# Patient Record
Sex: MALE | Race: WHITE | Employment: FULL TIME | URBAN - METROPOLITAN AREA
[De-identification: names, ages, dates, MRNs, and addresses within clinical notes are randomized per-mention and may not be internally consistent; named-entity substitution may affect disease eponyms.]

---

## 2018-12-24 ENCOUNTER — HOSPITAL ENCOUNTER (EMERGENCY)
Age: 33
Discharge: HOME | End: 2018-12-24
Attending: EMERGENCY MEDICINE
Payer: SELF-PAY

## 2018-12-24 VITALS
BODY MASS INDEX: 40.43 KG/M2 | SYSTOLIC BLOOD PRESSURE: 159 MMHG | HEART RATE: 108 BPM | TEMPERATURE: 98.9 F | RESPIRATION RATE: 23 BRPM | WEIGHT: 315 LBS | HEIGHT: 74 IN | OXYGEN SATURATION: 99 % | DIASTOLIC BLOOD PRESSURE: 95 MMHG

## 2018-12-24 DIAGNOSIS — I47.1 SUSTAINED SVT (HCC): Primary | ICD-10-CM

## 2018-12-24 DIAGNOSIS — D72.829 LEUKOCYTOSIS, UNSPECIFIED TYPE: ICD-10-CM

## 2018-12-24 LAB
ALBUMIN SERPL-MCNC: 4.1 G/DL (ref 3.5–5)
ALBUMIN/GLOB SERPL: 0.9 {RATIO} (ref 1.1–2.2)
ALP SERPL-CCNC: 85 U/L (ref 45–117)
ALT SERPL-CCNC: 56 U/L (ref 12–78)
ANION GAP SERPL CALC-SCNC: 12 MMOL/L (ref 5–15)
AST SERPL-CCNC: 34 U/L (ref 15–37)
ATRIAL RATE: 241 BPM
ATRIAL RATE: 99 BPM
BASOPHILS # BLD: 0.1 K/UL (ref 0–0.1)
BASOPHILS NFR BLD: 1 % (ref 0–1)
BILIRUB SERPL-MCNC: 0.4 MG/DL (ref 0.2–1)
BUN SERPL-MCNC: 11 MG/DL (ref 6–20)
BUN/CREAT SERPL: 12 (ref 12–20)
CALCIUM SERPL-MCNC: 9 MG/DL (ref 8.5–10.1)
CALCULATED P AXIS, ECG09: 26 DEGREES
CALCULATED R AXIS, ECG10: 130 DEGREES
CALCULATED R AXIS, ECG10: 94 DEGREES
CALCULATED T AXIS, ECG11: -100 DEGREES
CALCULATED T AXIS, ECG11: 28 DEGREES
CHLORIDE SERPL-SCNC: 102 MMOL/L (ref 97–108)
CK MB CFR SERPL CALC: 1 % (ref 0–2.5)
CK MB SERPL-MCNC: 2.7 NG/ML (ref 5–25)
CK SERPL-CCNC: 272 U/L (ref 39–308)
CO2 SERPL-SCNC: 24 MMOL/L (ref 21–32)
CREAT SERPL-MCNC: 0.91 MG/DL (ref 0.7–1.3)
DIAGNOSIS, 93000: NORMAL
DIAGNOSIS, 93000: NORMAL
DIFFERENTIAL METHOD BLD: ABNORMAL
EOSINOPHIL # BLD: 0 K/UL (ref 0–0.4)
EOSINOPHIL NFR BLD: 0 % (ref 0–7)
ERYTHROCYTE [DISTWIDTH] IN BLOOD BY AUTOMATED COUNT: 12.8 % (ref 11.5–14.5)
GLOBULIN SER CALC-MCNC: 4.4 G/DL (ref 2–4)
GLUCOSE SERPL-MCNC: 134 MG/DL (ref 65–100)
HCT VFR BLD AUTO: 53 % (ref 36.6–50.3)
HGB BLD-MCNC: 18.3 G/DL (ref 12.1–17)
IMM GRANULOCYTES # BLD: 0.2 K/UL (ref 0–0.04)
IMM GRANULOCYTES NFR BLD AUTO: 1 % (ref 0–0.5)
LYMPHOCYTES # BLD: 3.1 K/UL (ref 0.8–3.5)
LYMPHOCYTES NFR BLD: 20 % (ref 12–49)
MCH RBC QN AUTO: 32.2 PG (ref 26–34)
MCHC RBC AUTO-ENTMCNC: 34.5 G/DL (ref 30–36.5)
MCV RBC AUTO: 93.1 FL (ref 80–99)
MONOCYTES # BLD: 1.7 K/UL (ref 0–1)
MONOCYTES NFR BLD: 11 % (ref 5–13)
NEUTS SEG # BLD: 10.2 K/UL (ref 1.8–8)
NEUTS SEG NFR BLD: 67 % (ref 32–75)
NRBC # BLD: 0 K/UL (ref 0–0.01)
NRBC BLD-RTO: 0 PER 100 WBC
P-R INTERVAL, ECG05: 150 MS
PLATELET # BLD AUTO: 286 K/UL (ref 150–400)
PMV BLD AUTO: 10.5 FL (ref 8.9–12.9)
POTASSIUM SERPL-SCNC: 3.4 MMOL/L (ref 3.5–5.1)
PROT SERPL-MCNC: 8.5 G/DL (ref 6.4–8.2)
Q-T INTERVAL, ECG07: 180 MS
Q-T INTERVAL, ECG07: 378 MS
QRS DURATION, ECG06: 100 MS
QRS DURATION, ECG06: 74 MS
QTC CALCULATION (BEZET), ECG08: 359 MS
QTC CALCULATION (BEZET), ECG08: 485 MS
RBC # BLD AUTO: 5.69 M/UL (ref 4.1–5.7)
SODIUM SERPL-SCNC: 138 MMOL/L (ref 136–145)
TROPONIN I SERPL-MCNC: <0.05 NG/ML
VENTRICULAR RATE, ECG03: 239 BPM
VENTRICULAR RATE, ECG03: 99 BPM
WBC # BLD AUTO: 15.4 K/UL (ref 4.1–11.1)

## 2018-12-24 PROCEDURE — 36415 COLL VENOUS BLD VENIPUNCTURE: CPT

## 2018-12-24 PROCEDURE — 96374 THER/PROPH/DIAG INJ IV PUSH: CPT

## 2018-12-24 PROCEDURE — 96361 HYDRATE IV INFUSION ADD-ON: CPT

## 2018-12-24 PROCEDURE — 82550 ASSAY OF CK (CPK): CPT

## 2018-12-24 PROCEDURE — 80053 COMPREHEN METABOLIC PANEL: CPT

## 2018-12-24 PROCEDURE — 84484 ASSAY OF TROPONIN QUANT: CPT

## 2018-12-24 PROCEDURE — 93005 ELECTROCARDIOGRAM TRACING: CPT

## 2018-12-24 PROCEDURE — 99285 EMERGENCY DEPT VISIT HI MDM: CPT

## 2018-12-24 PROCEDURE — 74011250636 HC RX REV CODE- 250/636: Performed by: EMERGENCY MEDICINE

## 2018-12-24 PROCEDURE — 74011250636 HC RX REV CODE- 250/636

## 2018-12-24 PROCEDURE — 85025 COMPLETE CBC W/AUTO DIFF WBC: CPT

## 2018-12-24 RX ORDER — ADENOSINE 3 MG/ML
12 INJECTION, SOLUTION INTRAVENOUS
Status: COMPLETED | OUTPATIENT
Start: 2018-12-24 | End: 2018-12-24

## 2018-12-24 RX ORDER — ADENOSINE 3 MG/ML
INJECTION, SOLUTION INTRAVENOUS
Status: COMPLETED
Start: 2018-12-24 | End: 2018-12-24

## 2018-12-24 RX ADMIN — ADENOSINE 12 MG: 3 INJECTION, SOLUTION INTRAVENOUS at 00:52

## 2018-12-24 RX ADMIN — SODIUM CHLORIDE 1000 ML: 900 INJECTION, SOLUTION INTRAVENOUS at 00:51

## 2018-12-24 NOTE — DISCHARGE INSTRUCTIONS
Supraventricular Tachycardia: Care Instructions  Your Care Instructions    Having supraventricular tachycardia (SVT) means that from time to time your heart beats abnormally fast. This fast rhythm is caused by changes in the electrical system of your heart. You may feel a fluttering in your chest (palpitations) and have a fast pulse. When your heart is beating fast, you may feel anxious and lightheaded, be short of breath, and feel discomfort in the chest.  Your doctor may prescribe medicines to help slow down your heartbeat. Your doctor may also suggest you try vagal maneuvers when having an episode of SVT. These are things, like bearing down, that might help slow your heart rate. Bearing down means that you try to breathe out with your stomach muscles but you don't let air out of your nose or mouth. Your doctor can show you how to do vagal maneuvers. He or she may suggest you lie down on your back to do them. In some cases, either cardioversion treatment or a procedure called catheter ablation is done to correct SVT. Your doctor may ask you to wear a small electronic device for 1 or 2 days to monitor your heart. It is called a Holter monitor. Follow-up care is a key part of your treatment and safety. Be sure to make and go to all appointments, and call your doctor if you are having problems. It's also a good idea to know your test results and keep a list of the medicines you take. How can you care for yourself at home? · Be safe with medicines. Take your medicines exactly as prescribed. Call your doctor if you think you are having a problem with your medicine. You will get more details on the specific medicines your doctor prescribes. · If your doctor showed you how to do vagal maneuvers, try them when you have an episode. These maneuvers include bearing down or putting an ice-cold, wet towel on your face. · Monitor your condition by keeping a diary of your SVT episodes.  Bring this to your doctor appointments. ? Write down how fast or slow your heart was beating. To count your heart rate:  § Gently place 2 fingers of your hand on the inside of your other wrist, below your thumb. § Count the beats for 30 seconds. § Then, double the result to get the number of beats per minute. ? Write down if your heart rhythm was regular or irregular. ? Write down the symptoms you had.  ? Write down the time of day your symptoms occurred. ? Write down how long your symptoms lasted. ? Write down what you were doing when your symptoms started. ? Write down what may have helped your symptoms go away. · If they trigger episodes, limit or avoid alcohol or drinks with caffeine. · Do not use over-the-counter decongestants, herbal remedies, diet pills, or \"pep\" pills, which often contain stimulants. · Do not use illegal drugs, such as cocaine, ecstasy, or methamphetamine, which can speed up your heart's rhythm. · Do not smoke. Smoking can make this condition worse. If you need help quitting, talk to your doctor about stop-smoking programs and medicines. These can increase your chances of quitting for good. · Be alert for new or worsening symptoms, such as shortness of breath, pounding of your heart, or unusual tiredness. If new symptoms develop or your symptoms become worse, call your doctor. When should you call for help? Call 911 anytime you think you may need emergency care. For example, call if:    · You passed out (lost consciousness).     · You are short of breath.    Call your doctor now or seek immediate medical care if:    · You have a fast heartbeat.     · You are dizzy or lightheaded, or feel like you may faint.    Watch closely for changes in your health, and be sure to contact your doctor if:    · You do not get better as expected. Where can you learn more? Go to http://erin-yuriy.info/.   Enter G244 in the search box to learn more about \"Supraventricular Tachycardia: Care Instructions. \"  Current as of: December 6, 2017  Content Version: 11.8  © 2382-8339 [employer]. Care instructions adapted under license by [employer] (which disclaims liability or warranty for this information). If you have questions about a medical condition or this instruction, always ask your healthcare professional. [address] any warranty or liability for your use of this information. Chemical Cardioversion: Care Instructions  Your Care Instructions    Cardioversion resets your heart's rhythm to its normal pattern. It treats heart rhythm problems like atrial fibrillation or supraventricular tachycardia. Chemical cardioversion uses rhythm-control medicines to reset your heart. They can also help keep your heart in a normal rhythm after it has been reset. You may take this medicine as pills. Or you may get it in your arm through a tube called an IV. If you have an IV, it will be done in the hospital. If you use the pills, you might start them in the hospital. Or you might start the pills at home. Your doctor may ask you to take other medicines before your cardioversion. They can help keep blood clots from forming. And they can prevent the heart-rate problem from coming back. Sometimes the heart rate doesn't go back to normal. Or it may reset for a while and then go back to an uneven rate. If this happens, you may need electrical cardioversion. Follow-up care is a key part of your treatment and safety. Be sure to make and go to all appointments, and call your doctor if you are having problems. It's also a good idea to know your test results and keep a list of the medicines you take. How can you care for yourself at home? · Talk to your doctor about the benefits and risks of these medicines. They might cause serious side effects. Your doctor will want to see you often. Be sure to go to all of your doctor visits.   · Take your medicines exactly as prescribed. Call your doctor if you think you are having a problem with your medicine. · Check with your doctor or pharmacist before you use any other medicines. This includes over-the-counter medicines. Make sure your doctor knows all of the medicines, vitamins, herbal products, and supplements you take. Taking some medicines together can cause problems. When should you call for help? Call 911 anytime you think you may need emergency care. For example, call if:    · You passed out (lost consciousness).     · You have chest pain or pressure. This may occur with:  ? Sweating. ? Shortness of breath. ? Nausea or vomiting. ? Pain, pressure, or a strange feeling in your back, neck, jaw, or upper belly or in one or both shoulders or arms. ? Lightheadedness or sudden weakness. ? A fast or irregular heartbeat.    After you call 911, the  may tell you to chew 1 adult-strength or 2 to 4 low-dose aspirin. Wait for an ambulance. Do not try to drive yourself.    · You have symptoms of a stroke. These may include:  ? Sudden numbness, tingling, weakness, or loss of movement in your face, arm, or leg, especially on only one side of your body. ? Sudden vision changes. ? Sudden trouble speaking. ? Sudden confusion or trouble understanding simple statements. ? Sudden problems with walking or balance. ? A sudden, severe headache that is different from past headaches.    Call your doctor now or seek immediate medical care if:    · You are dizzy or lightheaded, or you feel like you may faint.     · You have a fast or irregular heartbeat.    Watch closely for changes in your health, and be sure to contact your doctor if you are having any problems. Where can you learn more? Go to http://erin-yuriy.info/. Enter M734 in the search box to learn more about \"Chemical Cardioversion: Care Instructions. \"  Current as of: December 6, 2017  Content Version: 11.8  © 3009-5829 [employer], [employer]. Care instructions adapted under license by [employer] (which disclaims liability or warranty for this information). If you have questions about a medical condition or this instruction, always ask your healthcare professional. [address] any warranty or liability for your use of this information. Learning About High White Blood Cell Counts  What are white blood cells? White blood cells (leukocytes) help protect your body from infection. Normally, when germs get inside your body, your body makes more white blood cells that search for and destroy the germs. Less often, there are medical problems where the body may make a lot more white blood cells than it needs. What happens when you have a high white blood cell count? Your white blood cell count may be high because your body is fighting an infection. But other things can cause it, such as some medicines, burns, an illness, or other health problems. When your doctor sees that your white blood cell count is high, he or she will try to find out why, and then treat the cause. What are the symptoms? A high white blood cell count alone doesn't cause any symptoms. The symptoms you feel may come from the medical problem that your white blood cells are fighting. For example, if you have pneumonia, you may have a fever and trouble breathing. These are symptoms of pneumonia, not of a high white blood cell count. How is it treated? · Your doctor may do more tests to find the problem that's making your white blood cell count high. Once your doctor finds the problem, he or she may be able to treat it. · Part of your treatment may be telling your doctor if you feel worse. Watch your temperature, and call your doctor if your fever goes up and stays up. Follow-up care is a key part of your treatment and safety. Be sure to make and go to all appointments, and call your doctor if you are having problems.  It's also a good idea to know your test results and keep a list of the medicines you take. Where can you learn more? Go to http://erin-yuriy.info/. Enter C891 in the search box to learn more about \"Learning About High White Blood Cell Counts. \"  Current as of: June 26, 2018  Content Version: 11.8  © 9489-0969 [employer]. Care instructions adapted under license by [employer] (which disclaims liability or warranty for this information). If you have questions about a medical condition or this instruction, always ask your healthcare professional. [place] [address] any warranty or liability for your use of this information.     Local Primary Care Physicians     [facility] [phone]   MD Marialuisa Rocha MD     [facility] [phone]   Colt Baig, [facility]   MD Ayan Camejo MD Abron Bale, MD [address] [address] [phone]   MD Amy Larson MD    [address] [facility] [phone]   MD Ilya Montemayor MD Emeline Hoffmann, MD Armando Baron, MD     [facility] [phone]   [facility] JCFFGM DUGLAS, MD Asad Gavin, NP     [facility] [phone]   [place] MD Berenice Hsu MD Eddy Gails, MD Marquetta Raker, MD Alyne Kohler, MD Lorena Carson, MD Mindi Artist, MD     [facility] [phone]   Kareem Tobias MD    [facility] [phone]   MD Carter Padilla, NP   MD Miles Arvizu MD Bethel Arista, MD Devorah Oto, MD     [address] [facility] [phone]   MD Pardeep Stanley, P   Lawrence Solo, MD Elizabeth Will MD Sabino Dinning, MD Era Barber, MD    [facility] [phone]   MD Marshall Miner, DO  MD Elijah Maria MD Melanie Dies, MD Zackery Decent, MD     [facility] [phone]   MD Shyam Noyola MD [place] [phone]   MD Iván Avendano MD Sharl Craven, MD     [address] [facility] [phone]   MD Lucila Vasquez MD Consuella Jerry, MD Fausto Frater, MD Reda Moos, VICKI Chou MD     [address] [facility] [phone]   MD Mary Ann Wise MD Harrietta Delaware, MD     [address] [facility] [phone] 1535 MD Julián Green, FNP   Rosalind Arrington, PAJonoC   Rosalind Arrington, FNP   Krista Fischer, PA-C   MD Linh Capellan, NP   Jan Reyes DO     Miscellaneous:     [facility] SYSTEMS Departments    For adult and child immunizations, family planning, TB screening, STD testing and women's health services. [facility] [place] [phone]     [address]: [facility] [address] [facility] [address] [facility] [address] [phone]     [facility]        [address] [place] [address]    For primary care services, woman and child wellness, and some clinics providing specialty care. [facility]. [facility]5 [facility] [phone]  [address] [place] [address] 200 [facility] 3611 [facility] [phone]  [address] [facility] [address] [phone]  [address]  [place] [address] [place] [address] [address] [facility] [address] [phone] [address] [facility] [address]  [address] [address] [place] [phone]  [facility] 81 [facility] [phone]  [address] [place] [facility] [place] [address] [place] [address] [phone]  [facility]: [address] [phone], [facility], [address] [phone]    [place] [address] [phone]  [facility] [address] [facility] [phone]  [employer]  [address], [address] ([email or web address]) [phone]       Sexual Health/Woman Wellness Clinics   For STD/HIV testing and treatment, pregnancy testing and services, men's health, birth control services, LGBT services, and hepatitis/HPV vaccine services. [facility] [employer] [address] [facility] 1900 [facility] [address] [facility] [address] [phone]  [facility] [address], 5th floor [phone]  Pregnancy 3928 [facility] [address] [facility] [address] for Women [address] [phone] 8260 [facility] 401 [facility],[address]   [phone]  [place]   [phone]  Women, Infant and Children's Services: [address] [phone]      [address] [phone]  [facility]   [phone]  [facility]   [phone]  [facility]     [phone]  [address] [place] [address] [phone]    Thank you for allowing us to provide you with excellent care today. We hope we addressed all of your concerns and needs. We strive to provide excellent quality care in the Emergency Department. Please rate us as excellent, as anything less than excellent does not meet our expectations. If you feel that you have not received excellent quality care or timely care, please ask to speak to the nurse manager. Please choose us in the future for your continued health care needs. The exam and treatment you received in the Emergency Department were for an urgent problem and are not intended as complete care.  It is important that you follow up with a doctor, nurse practitioner, or physician assistant for ongoing care. If your symptoms become worse or you do not improve as expected and you are unable to reach your usual health care provider, you should return to the Emergency Department. We are available 24 hours a day. Take this sheet with you when you go to your follow-up visit. If you have any problem arranging the follow-up visit, contact the Emergency Department immediately. If a prescription has been provided, please have it filled as soon as possible to avoid a delay in treatment. Read the entire medication instruction sheet provided to you by the pharmacy. If you have any questions or reservations about taking the medication due to side effects or interactions with other medications please call the ER or your primary care physician. Take this sheet with you when you go to your follow-up visit. Make an appointment with your family doctor or the physician you were referred to for follow-up of this visit, as this is mandatory follow-up. Return to the ER if you are unable to be seen or if you are unable to be seen in a timely manner. If you have any problem arranging the follow-up visit, contact the Emergency Department immediately.

## 2018-12-24 NOTE — ED NOTES
Emergency Department Nursing Progress Note:    Patient ambulatory to restroom with steady gait. Denies dizziness or sensation of palpitations.

## 2018-12-24 NOTE — ED NOTES
Assumed care of pt. Pt states around 1930 he noticed he was having chest pain. Pt has hx of SVT. Pt was given 12 mg Adenosine. Pt on 4L O2 via nasal cannula.

## 2018-12-24 NOTE — ED PROVIDER NOTES
EMERGENCY DEPARTMENT HISTORY AND PHYSICAL EXAM           Date: 12/24/2018  Patient Name: Cathy Tello    History of Presenting Illness     Chief Complaint   Patient presents with    Rapid Heart Rate       History Provided By: Patient    HPI: Cathy Tello is a 26 y.o. male, pmhx SVT, who presents ambulatory to the ED c/o persistent substernal chest pain x 6 hours. Pt states the pain radiates up into his throat and is described as pressure. Upon ED arrival pt's heart rate 238. He reports hx of similar sx's, however \"I'm typically able to breath through it\" and states he is not currently followed by cardiology or primary care. He is not currently on any daily medications. Pt denies hx of [employer] Syndrome. He specifically denies any recent fevers, chills, nausea, vomiting, diarrhea, abd pain, SOB, urinary sxs, changes in BM, or headache. PCP: None    Allergies: none  PMHx: Significant for SVT  PSHx: Significant for none  Social Hx: + tobacco (1 ppd), + EtOH (1 beer/day), - Illicit Drugs    There are no other complaints, changes, or physical findings at this time. Past History     Past Medical History:  Past Medical History:   Diagnosis Date    Heart failure ([address]     SVT       Past Surgical History:  History reviewed. No pertinent surgical history. Family History:  History reviewed. No pertinent family history. Social History:  Social History     Tobacco Use    Smoking status: Current Every Day Smoker     Packs/day: 1.00    Smokeless tobacco: Never Used   Substance Use Topics    Alcohol use: Yes     Comment: 1 drink (beer) a day    Drug use: No       Allergies:  No Known Allergies      Review of Systems   Review of Systems   Constitutional: Negative for chills and fever. HENT: Negative. Eyes: Negative. Respiratory: Negative for cough, chest tightness and shortness of breath. Cardiovascular: Positive for chest pain. Negative for leg swelling.    Gastrointestinal: Negative for abdominal pain, diarrhea, nausea and vomiting. Endocrine: Negative. Genitourinary: Negative for difficulty urinating and dysuria. Musculoskeletal: Negative for myalgias. Skin: Negative. Neurological: Negative. Negative for headaches. Psychiatric/Behavioral: Negative. All other systems reviewed and are negative. Physical Exam   Physical Exam   Constitutional: He is oriented to person, place, and time. He appears well-developed and well-nourished. He appears distressed. HENT:   Head: Normocephalic and atraumatic. Nose: Nose normal.   Mouth/Throat: No oropharyngeal exudate. Eyes: Conjunctivae and EOM are normal. Pupils are equal, round, and reactive to light. Neck: Normal range of motion. Neck supple. No JVD present. Cardiovascular: Regular rhythm, normal heart sounds, intact distal pulses and normal pulses. Tachycardia present. Exam reveals no friction rub. No murmur heard. Pulmonary/Chest: Effort normal and breath sounds normal. No stridor. No respiratory distress. He has no wheezes. He has no rales. Abdominal: Soft. Bowel sounds are normal. He exhibits no distension. There is no tenderness. There is no rebound. Musculoskeletal: Normal range of motion. He exhibits no tenderness. Neurological: He is alert and oriented to person, place, and time. No cranial nerve deficit. Skin: Skin is warm. No rash noted. He is diaphoretic. Psychiatric: He has a normal mood and affect. His speech is normal and behavior is normal. Judgment and thought content normal. Cognition and memory are normal.   Nursing note and vitals reviewed.         Diagnostic Study Results     Labs -     Recent Results (from the past 12 hour(s))   EKG, 12 LEAD, INITIAL    Collection Time: 12/24/18 12:41 AM   Result Value Ref Range    Ventricular Rate 239 BPM    Atrial Rate 241 BPM    QRS Duration 74 ms    Q-T Interval 180 ms    QTC Calculation (Bezet) 359 ms    Calculated R Axis 130 degrees    Calculated T Axis -100 degrees    Diagnosis       Supraventricular tachycardia  Right axis deviation  Marked ST abnormality, possible inferior subendocardial injury  Marked ST abnormality, possible anterolateral subendocardial injury  No previous ECGs available     CBC WITH AUTOMATED DIFF    Collection Time: 12/24/18 12:56 AM   Result Value Ref Range    WBC 15.4 (H) 4.1 - 11.1 K/uL    RBC 5.69 4.10 - 5.70 M/uL    HGB 18.3 (H) 12.1 - 17.0 g/dL    HCT 53.0 (H) 36.6 - 50.3 %    MCV 93.1 80.0 - 99.0 FL    MCH 32.2 26.0 - 34.0 PG    MCHC 34.5 30.0 - 36.5 g/dL    RDW 12.8 11.5 - 14.5 %    PLATELET 765 005 - 849 K/uL    MPV 10.5 8.9 - 12.9 FL    NRBC 0.0 0  WBC    ABSOLUTE NRBC 0.00 0.00 - 0.01 K/uL    NEUTROPHILS 67 32 - 75 %    LYMPHOCYTES 20 12 - 49 %    MONOCYTES 11 5 - 13 %    EOSINOPHILS 0 0 - 7 %    BASOPHILS 1 0 - 1 %    IMMATURE GRANULOCYTES 1 (H) 0.0 - 0.5 %    ABS. NEUTROPHILS 10.2 (H) 1.8 - 8.0 K/UL    ABS. LYMPHOCYTES 3.1 0.8 - 3.5 K/UL    ABS. MONOCYTES 1.7 (H) 0.0 - 1.0 K/UL    ABS. EOSINOPHILS 0.0 0.0 - 0.4 K/UL    ABS. BASOPHILS 0.1 0.0 - 0.1 K/UL    ABS. IMM. GRANS. 0.2 (H) 0.00 - 0.04 K/UL    DF AUTOMATED     METABOLIC PANEL, COMPREHENSIVE    Collection Time: 12/24/18 12:56 AM   Result Value Ref Range    Sodium 138 136 - 145 mmol/L    Potassium 3.4 (L) 3.5 - 5.1 mmol/L    Chloride 102 97 - 108 mmol/L    CO2 24 21 - 32 mmol/L    Anion gap 12 5 - 15 mmol/L    Glucose 134 (H) 65 - 100 mg/dL    BUN 11 6 - 20 MG/DL    Creatinine 0.91 0.70 - 1.30 MG/DL    BUN/Creatinine ratio 12 12 - 20      GFR est AA >60 >60 ml/min/1.73m2    GFR est non-AA >60 >60 ml/min/1.73m2    Calcium 9.0 8.5 - 10.1 MG/DL    Bilirubin, total 0.4 0.2 - 1.0 MG/DL    ALT (SGPT) 56 12 - 78 U/L    AST (SGOT) 34 15 - 37 U/L    Alk.  phosphatase 85 45 - 117 U/L    Protein, total 8.5 (H) 6.4 - 8.2 g/dL    Albumin 4.1 3.5 - 5.0 g/dL    Globulin 4.4 (H) 2.0 - 4.0 g/dL    A-G Ratio 0.9 (L) 1.1 - 2.2     CK W/ CKMB & INDEX    Collection Time: 12/24/18 12:56 AM   Result Value Ref Range     39 - 308 U/L    CK - MB 2.7 <3.6 NG/ML    CK-MB Index 1.0 0 - 2.5     TROPONIN I    Collection Time: 12/24/18 12:56 AM   Result Value Ref Range    Troponin-I, Qt. <0.05 <0.05 ng/mL   EKG, 12 LEAD, SUBSEQUENT    Collection Time: 12/24/18  2:04 AM   Result Value Ref Range    Ventricular Rate 99 BPM    Atrial Rate 99 BPM    P-R Interval 150 ms    QRS Duration 100 ms    Q-T Interval 378 ms    QTC Calculation (Bezet) 485 ms    Calculated P Axis 26 degrees    Calculated R Axis 94 degrees    Calculated T Axis 28 degrees    Diagnosis       Normal sinus rhythm  Rightward axis  Prolonged QT  When compared with ECG of 24-DEC-2018 00:41,  MANUAL COMPARISON REQUIRED, DATA IS UNCONFIRMED       Medical Decision Making   I am the first provider for this patient. I reviewed the vital signs, available nursing notes, past medical history, past surgical history, family history and social history. Vital Signs-Reviewed the patient's vital signs. Patient Vitals for the past 12 hrs:   Temp Pulse Resp BP SpO2   12/24/18 0107  (!) 108 23  99 %   12/24/18 0100  (!) 120 19 (!) 159/95 98 %   12/24/18 0052  (!) 238  (!) 153/101    12/24/18 0046 98.9 °F (37.2 °C) (!) 238 16 (!) 153/101 98 %       Pulse Oximetry Analysis - 98% on RA    Cardiac Monitor:   Rate: 238 bpm  Rhythm: Supraventricular Tachycardia (SVT)      Records Reviewed: Nursing Notes and Old Medical Records    Provider Notes (Medical Decision Making):     DDX:  Svt, afib with rvr, electrolyte abnormality    Plan:  Ekg, labs, adenosine, ivf    Impression:  psvt    ED Course:   Initial assessment performed. The patients presenting problems have been discussed, and they are in agreement with the care plan formulated and outlined with them. I have encouraged them to ask questions as they arise throughout their visit.     I reviewed our electronic medical record system for any past medical records that were available that may contribute to the patients current condition, the nursing notes and and vital signs from today's visit     Nursing notes will be reviewed as they become available in realtime while the pt has been in the ED. Delfino Suero MD     I have spent 3-5 minutes discussing the medical risks of prolonged smoking habits and advised the patient of the benefits of the cessation of smoking, providing specific suggestions on how to quit. Delfino Suero MD    EKG interpretation 3051: svt, R Axis, rate 239; [place] not calculated, QRS 74, QTc 359; no acute ischemia; Delfino Suero MD    Procedure Note - Chemical Cardioversion:   12:51 AM  Performed by Delfino Suero MD .  Cardioversion was indicated for a rhythm of stable tachycardia and performed 1 times. 12mg of adenosine were given to pt. Patient's rhythm was sinus tachycardia at the end of the procedure. The procedure took 1-15 minutes, and pt tolerated well. PROGRESS NOTE  1:10 AM   Pt noting to feel better immediately after adenosine adnministration. HR in 110's, cp resolved. Will continue to monitor and send labs. Delfino Suero MD    EKG interpretation 1467: NSR, nl Axis, rate 99; , , QTc 485; no acute ischemia; Delfino Suero MD    PROGRESS NOTE:  2:08 AM  Pt's heart rate is 106 and he states he is feeling better. He states he has a family hx of SVT. Awaiting repeat CXR. Informed pt that his WBC is elevated at 15.4. Marcos Quinonez, ED Scribe, as dictated by Delfino Suero MD    Progress note:  Pt noted to be feeling better, ready for discharge. Discussed lab findings with pt, specifically noting elevated WBC. Pt will follow up as instructed. All questions have been answered, pt voiced understanding and agreement with plan. Specific return precautions provided in addition to instructions for pt to return to the ED immediately should sx worsen at any time.   Delfino Suero MD    CRITICAL CARE NOTE :    1:00 AM    IMPENDING DETERIORATION -Airway, Respiratory, Cardiovascular, CNS and Metabolic  ASSOCIATED RISK FACTORS - Dysrhythmia, Vascular Compromise and CNS Decompensation  MANAGEMENT- Bedside Assessment and Supervision of Care  INTERPRETATION -  ECG and Blood Pressure  INTERVENTIONS - hemodynamic mngmt, vascular control, Metobolic interventions and chemical cardioversion  CASE REVIEW - Hospitalist, Nursing and Family  TREATMENT RESPONSE -Improved  PERFORMED BY - Self    NOTES   :    I have spent 90 minutes of critical care time involved in lab review, consultations with specialist, family decision- making, bedside attention and documentation. During this entire length of time I was immediately available to the patient . Zeferino Lutz MD    Diagnosis     Clinical Impression:   1. Sustained SVT (HCC)    2. Leukocytosis, unspecified type        PLAN:  1. Discharge home   2. Follow-up Information     Follow up With Specialties Details Why Contact Info    Rachelle Gill MD Cardiology Schedule an appointment as soon as possible for a visit in 2 days  [address]  [address]  [address] [phone]      [facility] EMERGENCY DEPT Emergency Medicine  As needed [address] [place] [address]  [address] [facility]  [phone]        Return to ED if worse     Disposition:    2:13 AM   The patient's results have been reviewed with family and/or caregiver. They verbally convey their understanding and agreement of the patient's signs, symptoms, diagnosis, treatment and prognosis and additionally agree to follow up as recommended in the discharge instructions or to return to the Emergency Room should the patient's condition change prior to their follow-up appointment. The family and/or caregiver verbally agrees with the care-plan and all of their questions have been answered.  The discharge instructions have also been provided to the them with educational information regarding the patient's diagnosis as well a list of reasons why the patient would want to return to the ER prior to their follow-up appointment should their condition change. Corrina Reveles MD          Attestations: This note is prepared by Nii Flores, acting as Scribe for MD Corrina Garnica MD : The scribe's documentation has been prepared under my direction and personally reviewed by me in its entirety. I confirm that the note above accurately reflects all work, treatment, procedures, and medical decision making performed by me. This note will not be viewable in [address]

## 2018-12-24 NOTE — ED NOTES
Dr. Stefano Camacho reviewed discharge instructions with the patient. The patient verbalized understanding. All questions and concerns were addressed. The patient declined a wheelchair and is discharged ambulatory in the care of family members with instructions and prescriptions in hand. Pt is alert and oriented x 4. Respirations are clear and unlabored.